# Patient Record
Sex: FEMALE | Race: ASIAN
[De-identification: names, ages, dates, MRNs, and addresses within clinical notes are randomized per-mention and may not be internally consistent; named-entity substitution may affect disease eponyms.]

---

## 2021-10-12 ENCOUNTER — APPOINTMENT (OUTPATIENT)
Dept: ORTHOPEDIC SURGERY | Facility: CLINIC | Age: 56
End: 2021-10-12
Payer: COMMERCIAL

## 2021-10-12 VITALS
TEMPERATURE: 97.1 F | WEIGHT: 125 LBS | HEART RATE: 71 BPM | SYSTOLIC BLOOD PRESSURE: 150 MMHG | BODY MASS INDEX: 23 KG/M2 | HEIGHT: 62 IN | DIASTOLIC BLOOD PRESSURE: 98 MMHG

## 2021-10-12 DIAGNOSIS — M17.12 UNILATERAL PRIMARY OSTEOARTHRITIS, LEFT KNEE: ICD-10-CM

## 2021-10-12 DIAGNOSIS — Z78.9 OTHER SPECIFIED HEALTH STATUS: ICD-10-CM

## 2021-10-12 DIAGNOSIS — Z82.49 FAMILY HISTORY OF ISCHEMIC HEART DISEASE AND OTHER DISEASES OF THE CIRCULATORY SYSTEM: ICD-10-CM

## 2021-10-12 DIAGNOSIS — M25.562 PAIN IN LEFT KNEE: ICD-10-CM

## 2021-10-12 DIAGNOSIS — Z80.0 FAMILY HISTORY OF MALIGNANT NEOPLASM OF DIGESTIVE ORGANS: ICD-10-CM

## 2021-10-12 DIAGNOSIS — S80.02XA CONTUSION OF LEFT KNEE, INITIAL ENCOUNTER: ICD-10-CM

## 2021-10-12 PROBLEM — Z00.00 ENCOUNTER FOR PREVENTIVE HEALTH EXAMINATION: Status: ACTIVE | Noted: 2021-10-12

## 2021-10-12 PROCEDURE — 99204 OFFICE O/P NEW MOD 45 MIN: CPT

## 2021-10-12 PROCEDURE — 73562 X-RAY EXAM OF KNEE 3: CPT | Mod: LT

## 2021-10-12 RX ORDER — MELOXICAM 15 MG/1
15 TABLET ORAL
Qty: 30 | Refills: 2 | Status: ACTIVE | COMMUNITY
Start: 2021-10-12 | End: 1900-01-01

## 2021-10-13 PROBLEM — M17.12 PRIMARY OSTEOARTHRITIS OF LEFT KNEE: Status: ACTIVE | Noted: 2021-10-12

## 2021-10-13 PROBLEM — M25.562 LEFT KNEE PAIN: Status: ACTIVE | Noted: 2021-10-12

## 2021-10-13 NOTE — PHYSICAL EXAM
[UE/LE] : Sensory: Intact in bilateral upper & lower extremities [ALL] : dorsalis pedis, posterior tibial, femoral, popliteal, and radial 2+ and symmetric bilaterally [Normal Finger/nose] : finger to nose coordination [Normal] : no peripheral adenopathy appreciated [Poor Appearance] : well-appearing [Acute Distress] : not in acute distress [de-identified] : Knee Exam\par \par \par FROM to hip\par \par Skin Warm, Dry, no erythema, no lesions \par \par Knee \par stable\par anatomic alignment\par ROM 0-130\par Valgus/Varus Stress intact \par Effusion - Negative\par Crepitus - Negative \par Patella Grind - Negative \par Patella Apprehension - Negative \par Medial joint line tenderness - Negative\par Lateral Joint line tenderness - Negative\par Justa Test - Negative  \par Lachman test - Negative \par Anterior Drawer Test -  Negative \par \par Distal Motor Function intact \par Sensation intact to light touch bilaterally \par Pulses 2+ bilaterally\par \par  [de-identified] : EVGENYR [de-identified] : 3 xray views of the knee were obtained.  No fractures or dislocations are noted.  There is significant patellofemoral arthritis.  Arthritic changes are noted in the medial and lateral compartments.  A patella tracking abnormality is likely secondary to lateral alignment of the patella in the femoral groove.

## 2021-10-13 NOTE — HISTORY OF PRESENT ILLNESS
[de-identified] : 10/12/2021: Patient is a 56 year-old female who presents to the office today for initial evaluation of knee pain. The patient had a fall while hiking this past Friday. She stated that she fell directly on her patella. She fell from a standing position into a position with her knee in full flexion. The pain is on both sides of the knee and across the superior aspect of the patella. She has been taking Ibuprofen which did give relief. She originally had swelling, but that has resolved since this injury. Patient has some pain with ambulation. She does note feelings of instability and locking. She presents today for further treatment options.

## 2021-10-13 NOTE — ASSESSMENT
[FreeTextEntry1] : Patient with left knee pain consistent with a contusion of the patella as well as a likely exacerbation of underlying primary osteoarthritis.  The nature of these conditions were described at length with the patient and she verbalized understanding.  I recommend a patella centralizing compressive knee brace for support.  A prescription for meloxicam was sent for the patient.  She was given a prescription for physical therapy as well.  The patient will follow back up as needed, and if pain persists, a cortisone injection could possibly be offered to the patient.  Patient is in agreement with this plan and appreciative of her care.

## 2023-01-28 ENCOUNTER — OFFICE (OUTPATIENT)
Dept: URBAN - METROPOLITAN AREA CLINIC 94 | Facility: CLINIC | Age: 58
Setting detail: OPHTHALMOLOGY
End: 2023-01-28
Payer: COMMERCIAL

## 2023-01-28 DIAGNOSIS — H43.813: ICD-10-CM

## 2023-01-28 DIAGNOSIS — H43.393: ICD-10-CM

## 2023-01-28 PROBLEM — H25.13 CATARACT SENILE NUCLEAR SCLEROSIS; BOTH EYES: Status: ACTIVE | Noted: 2023-01-28

## 2023-01-28 PROCEDURE — 92012 INTRM OPH EXAM EST PATIENT: CPT | Performed by: OPHTHALMOLOGY

## 2023-01-28 PROCEDURE — 92250 FUNDUS PHOTOGRAPHY W/I&R: CPT | Performed by: OPHTHALMOLOGY

## 2023-01-28 ASSESSMENT — CONFRONTATIONAL VISUAL FIELD TEST (CVF)
OS_FINDINGS: FULL
OD_FINDINGS: FULL

## 2023-01-28 ASSESSMENT — TONOMETRY
OD_IOP_MMHG: 17
OS_IOP_MMHG: 17

## 2023-01-28 ASSESSMENT — VISUAL ACUITY
OS_BCVA: 20/50-
OD_BCVA: 20/20-

## 2023-02-01 ENCOUNTER — Encounter (OUTPATIENT)
Dept: URBAN - METROPOLITAN AREA CLINIC 94 | Facility: CLINIC | Age: 58
Setting detail: OPHTHALMOLOGY
End: 2023-02-01
Payer: COMMERCIAL

## 2024-12-20 ENCOUNTER — OFFICE (OUTPATIENT)
Dept: URBAN - METROPOLITAN AREA CLINIC 6 | Facility: CLINIC | Age: 59
Setting detail: OPHTHALMOLOGY
End: 2024-12-20
Payer: COMMERCIAL

## 2024-12-20 DIAGNOSIS — H43.393: ICD-10-CM

## 2024-12-20 DIAGNOSIS — H43.813: ICD-10-CM

## 2024-12-20 DIAGNOSIS — H25.13: ICD-10-CM

## 2024-12-20 DIAGNOSIS — H25.89: ICD-10-CM

## 2024-12-20 DIAGNOSIS — H35.40: ICD-10-CM

## 2024-12-20 PROCEDURE — 99214 OFFICE O/P EST MOD 30 MIN: CPT | Performed by: OPHTHALMOLOGY

## 2024-12-20 ASSESSMENT — KERATOMETRY
OD_K2POWER_DIOPTERS: 45.00
OS_K2POWER_DIOPTERS: 45.50
OD_K1POWER_DIOPTERS: 44.00
OS_AXISANGLE_DEGREES: 098
OD_AXISANGLE_DEGREES: 090
OS_K1POWER_DIOPTERS: 44.00

## 2024-12-20 ASSESSMENT — REFRACTION_MANIFEST
OS_CYLINDER: -1.00
OD_CYLINDER: -1.00
OS_AXIS: 010
OD_SPHERE: -11.75
OD_AXIS: 065
OS_SPHERE: -8.50
OD_VA1: 20/125
OS_VA1: 20/25+2
OU_VA: 20/25+3

## 2024-12-20 ASSESSMENT — TONOMETRY
OD_IOP_MMHG: 17
OS_IOP_MMHG: 14

## 2024-12-20 ASSESSMENT — REFRACTION_CURRENTRX
OS_CYLINDER: -1.00
OD_ADD: +2.75
OD_AXIS: 003
OD_SPHERE: -7.50
OS_OVR_VA: 20/
OS_ADD: +2.75
OD_CYLINDER: -0.75
OD_OVR_VA: 20/
OS_SPHERE: -8.25
OD_VPRISM_DIRECTION: PROGS
OS_VPRISM_DIRECTION: PROGS
OS_AXIS: 005

## 2024-12-20 ASSESSMENT — REFRACTION_AUTOREFRACTION
OS_SPHERE: -8.75
OD_AXIS: 063
OS_AXIS: 009
OS_CYLINDER: -0.75
OD_CYLINDER: -1.00
OD_SPHERE: -20.25

## 2024-12-20 ASSESSMENT — CONFRONTATIONAL VISUAL FIELD TEST (CVF)
OD_FINDINGS: FULL
OS_FINDINGS: FULL

## 2024-12-20 ASSESSMENT — VISUAL ACUITY
OD_BCVA: 20/30-1
OS_BCVA: 20/500

## 2025-02-22 ENCOUNTER — OFFICE (OUTPATIENT)
Dept: URBAN - METROPOLITAN AREA CLINIC 6 | Facility: CLINIC | Age: 60
Setting detail: OPHTHALMOLOGY
End: 2025-02-22
Payer: COMMERCIAL

## 2025-02-22 DIAGNOSIS — H25.13: ICD-10-CM

## 2025-02-22 DIAGNOSIS — H25.11: ICD-10-CM

## 2025-02-22 DIAGNOSIS — H25.89: ICD-10-CM

## 2025-02-22 PROCEDURE — 92136 OPHTHALMIC BIOMETRY: CPT | Mod: TC | Performed by: OPHTHALMOLOGY

## 2025-02-22 PROCEDURE — 99213 OFFICE O/P EST LOW 20 MIN: CPT | Performed by: OPHTHALMOLOGY

## 2025-02-22 PROCEDURE — 92136 OPHTHALMIC BIOMETRY: CPT | Mod: 26,RT | Performed by: OPHTHALMOLOGY

## 2025-02-22 ASSESSMENT — REFRACTION_AUTOREFRACTION
OS_CYLINDER: -0.75
OS_SPHERE: -8.75
OD_SPHERE: ERR
OS_AXIS: 180

## 2025-02-22 ASSESSMENT — KERATOMETRY
OD_K2POWER_DIOPTERS: 44.75
OD_K1POWER_DIOPTERS: 43.75
OS_AXISANGLE_DEGREES: 95
OS_K2POWER_DIOPTERS: 45.50
OS_K1POWER_DIOPTERS: 44.00
OD_AXISANGLE_DEGREES: 90

## 2025-02-22 ASSESSMENT — REFRACTION_CURRENTRX
OD_CYLINDER: -0.75
OD_SPHERE: -7.50
OS_CYLINDER: -1.00
OS_AXIS: 005
OD_ADD: +2.75
OS_ADD: +2.75
OS_SPHERE: -8.25
OD_AXIS: 003
OD_VPRISM_DIRECTION: PROGS
OD_OVR_VA: 20/
OS_OVR_VA: 20/
OS_VPRISM_DIRECTION: PROGS

## 2025-02-22 ASSESSMENT — REFRACTION_MANIFEST
OU_VA: 20/25+3
OD_VA1: 20/125
OD_AXIS: 065
OS_CYLINDER: -1.00
OS_SPHERE: -8.50
OS_AXIS: 010
OD_SPHERE: -11.75
OS_VA1: 20/25+2
OD_CYLINDER: -1.00

## 2025-02-22 ASSESSMENT — CONFRONTATIONAL VISUAL FIELD TEST (CVF)
OD_FINDINGS: FULL
OS_FINDINGS: FULL

## 2025-02-22 ASSESSMENT — TONOMETRY
OD_IOP_MMHG: 18
OS_IOP_MMHG: 16

## 2025-02-22 ASSESSMENT — VISUAL ACUITY
OD_BCVA: 20/30-1
OS_BCVA: 20/300

## 2025-03-06 ENCOUNTER — ASC (OUTPATIENT)
Dept: URBAN - METROPOLITAN AREA SURGERY 8 | Facility: SURGERY | Age: 60
Setting detail: OPHTHALMOLOGY
End: 2025-03-06
Payer: COMMERCIAL

## 2025-03-06 DIAGNOSIS — H52.221: ICD-10-CM

## 2025-03-06 DIAGNOSIS — H25.11: ICD-10-CM

## 2025-03-06 PROCEDURE — 66984 XCAPSL CTRC RMVL W/O ECP: CPT | Mod: RT | Performed by: OPHTHALMOLOGY

## 2025-03-06 PROCEDURE — FEMTO PRECISION LASER CATARACT SURGERY: Mod: GY,RT | Performed by: OPHTHALMOLOGY

## 2025-03-07 ENCOUNTER — RX ONLY (RX ONLY)
Age: 60
End: 2025-03-07

## 2025-03-07 ENCOUNTER — OFFICE (OUTPATIENT)
Dept: URBAN - METROPOLITAN AREA CLINIC 6 | Facility: CLINIC | Age: 60
Setting detail: OPHTHALMOLOGY
End: 2025-03-07
Payer: COMMERCIAL

## 2025-03-07 DIAGNOSIS — H25.12: ICD-10-CM

## 2025-03-07 PROCEDURE — 92136 OPHTHALMIC BIOMETRY: CPT | Mod: 26,LT | Performed by: OPHTHALMOLOGY

## 2025-03-07 ASSESSMENT — REFRACTION_MANIFEST
OS_SPHERE: -8.50
OS_AXIS: 010
OD_AXIS: 065
OD_CYLINDER: -1.00
OS_VA1: 20/25+2
OD_SPHERE: -11.75
OS_CYLINDER: -1.00
OU_VA: 20/25+3
OD_VA1: 20/125

## 2025-03-07 ASSESSMENT — VISUAL ACUITY
OD_BCVA: 20/500
OS_BCVA: 20/25-2

## 2025-03-07 ASSESSMENT — REFRACTION_CURRENTRX
OS_OVR_VA: 20/
OD_VPRISM_DIRECTION: PROGS
OD_SPHERE: -7.50
OD_ADD: +2.75
OD_OVR_VA: 20/
OS_SPHERE: -8.25
OS_VPRISM_DIRECTION: PROGS
OD_CYLINDER: -0.75
OS_AXIS: 005
OD_AXIS: 003
OS_CYLINDER: -1.00
OS_ADD: +2.75

## 2025-03-07 ASSESSMENT — REFRACTION_AUTOREFRACTION
OS_AXIS: 005
OS_SPHERE: -9.25
OD_AXIS: 019
OD_CYLINDER: -1.25
OS_CYLINDER: -1.00
OD_SPHERE: +0.50

## 2025-03-07 ASSESSMENT — KERATOMETRY
OD_AXISANGLE_DEGREES: 112
OS_K2POWER_DIOPTERS: 45.75
OD_K2POWER_DIOPTERS: 44.75
OD_K1POWER_DIOPTERS: 43.50
OS_AXISANGLE_DEGREES: 090
OS_K1POWER_DIOPTERS: 44.00
METHOD_AUTO_MANUAL: AUTO

## 2025-03-07 ASSESSMENT — CONFRONTATIONAL VISUAL FIELD TEST (CVF)
OS_FINDINGS: FULL
OD_FINDINGS: FULL

## 2025-03-07 ASSESSMENT — TONOMETRY
OD_IOP_MMHG: 14
OS_IOP_MMHG: 13

## 2025-03-13 ENCOUNTER — ASC (OUTPATIENT)
Dept: URBAN - METROPOLITAN AREA SURGERY 8 | Facility: SURGERY | Age: 60
Setting detail: OPHTHALMOLOGY
End: 2025-03-13
Payer: COMMERCIAL

## 2025-03-13 DIAGNOSIS — H25.12: ICD-10-CM

## 2025-03-13 DIAGNOSIS — H52.222: ICD-10-CM

## 2025-03-13 PROCEDURE — 66984 XCAPSL CTRC RMVL W/O ECP: CPT | Mod: 79,LT | Performed by: OPHTHALMOLOGY

## 2025-03-13 PROCEDURE — FEMTO PRECISION LASER CATARACT SURGERY: Mod: GY | Performed by: OPHTHALMOLOGY

## 2025-03-14 ENCOUNTER — RX ONLY (RX ONLY)
Age: 60
End: 2025-03-14

## 2025-03-14 ENCOUNTER — OFFICE (OUTPATIENT)
Dept: URBAN - METROPOLITAN AREA CLINIC 6 | Facility: CLINIC | Age: 60
Setting detail: OPHTHALMOLOGY
End: 2025-03-14
Payer: COMMERCIAL

## 2025-03-14 DIAGNOSIS — H16.222: ICD-10-CM

## 2025-03-14 DIAGNOSIS — Z96.1: ICD-10-CM

## 2025-03-14 PROCEDURE — 99024 POSTOP FOLLOW-UP VISIT: CPT

## 2025-03-14 ASSESSMENT — TONOMETRY
OS_IOP_MMHG: 20
OD_IOP_MMHG: 19

## 2025-03-14 ASSESSMENT — REFRACTION_CURRENTRX
OD_SPHERE: -7.50
OS_CYLINDER: -1.00
OD_AXIS: 003
OS_SPHERE: -8.25
OS_AXIS: 005
OS_OVR_VA: 20/
OD_OVR_VA: 20/
OD_VPRISM_DIRECTION: PROGS
OD_ADD: +2.75
OS_ADD: +2.75
OD_CYLINDER: -0.75
OS_VPRISM_DIRECTION: PROGS

## 2025-03-14 ASSESSMENT — REFRACTION_AUTOREFRACTION
OD_AXIS: 022
OD_CYLINDER: -0.25
OS_AXIS: 035
OS_SPHERE: +0.25
OD_SPHERE: 0.00
OS_CYLINDER: -0.75

## 2025-03-14 ASSESSMENT — REFRACTION_MANIFEST
OS_VA1: 20/25+2
OS_AXIS: 010
OD_CYLINDER: -1.00
OD_SPHERE: -11.75
OU_VA: 20/25+3
OD_VA1: 20/125
OS_SPHERE: -8.50
OS_CYLINDER: -1.00
OD_AXIS: 065

## 2025-03-14 ASSESSMENT — VISUAL ACUITY
OD_BCVA: 20/20-2
OS_BCVA: 20/20-1

## 2025-03-14 ASSESSMENT — SUPERFICIAL PUNCTATE KERATITIS (SPK): OS_SPK: T

## 2025-03-14 ASSESSMENT — KERATOMETRY
OS_AXISANGLE_DEGREES: 105
OD_K1POWER_DIOPTERS: 44.00
OS_K1POWER_DIOPTERS: 44.25
METHOD_AUTO_MANUAL: AUTO
OD_K2POWER_DIOPTERS: 44.50
OD_AXISANGLE_DEGREES: 103
OS_K2POWER_DIOPTERS: 45.25

## 2025-03-14 ASSESSMENT — CONFRONTATIONAL VISUAL FIELD TEST (CVF)
OS_FINDINGS: FULL
OD_FINDINGS: FULL

## 2025-03-20 ENCOUNTER — OFFICE (OUTPATIENT)
Dept: URBAN - METROPOLITAN AREA CLINIC 6 | Facility: CLINIC | Age: 60
Setting detail: OPHTHALMOLOGY
End: 2025-03-20
Payer: COMMERCIAL

## 2025-03-20 DIAGNOSIS — Z96.1: ICD-10-CM

## 2025-03-20 PROCEDURE — 99024 POSTOP FOLLOW-UP VISIT: CPT

## 2025-03-20 ASSESSMENT — REFRACTION_MANIFEST
OS_CYLINDER: -1.00
OD_CYLINDER: -1.00
OS_AXIS: 010
OD_SPHERE: -11.75
OS_SPHERE: -8.50
OU_VA: 20/25+3
OD_AXIS: 065
OD_VA1: 20/125
OS_VA1: 20/25+2

## 2025-03-20 ASSESSMENT — REFRACTION_CURRENTRX
OD_CYLINDER: -0.75
OS_ADD: +2.75
OS_CYLINDER: -1.00
OD_OVR_VA: 20/
OD_SPHERE: -7.50
OD_VPRISM_DIRECTION: PROGS
OS_SPHERE: -8.25
OD_AXIS: 003
OD_ADD: +2.75
OS_AXIS: 005
OS_OVR_VA: 20/
OS_VPRISM_DIRECTION: PROGS

## 2025-03-20 ASSESSMENT — CONFRONTATIONAL VISUAL FIELD TEST (CVF)
OS_FINDINGS: FULL
OD_FINDINGS: FULL

## 2025-03-20 ASSESSMENT — KERATOMETRY
OD_K2POWER_DIOPTERS: 44.75
OD_AXISANGLE_DEGREES: 097
OS_AXISANGLE_DEGREES: 095
OS_K1POWER_DIOPTERS: 44.25
METHOD_AUTO_MANUAL: AUTO
OD_K1POWER_DIOPTERS: 44.00
OS_K2POWER_DIOPTERS: 45.50

## 2025-03-20 ASSESSMENT — VISUAL ACUITY
OD_BCVA: 20/20-1
OS_BCVA: 20/20-1

## 2025-03-20 ASSESSMENT — REFRACTION_AUTOREFRACTION
OD_AXIS: 017
OS_SPHERE: +0.50
OS_AXIS: 022
OS_CYLINDER: -1.00
OD_CYLINDER: -0.50
OD_SPHERE: -0.25

## 2025-03-20 ASSESSMENT — TONOMETRY
OD_IOP_MMHG: 20
OS_IOP_MMHG: 18

## 2025-03-20 ASSESSMENT — SUPERFICIAL PUNCTATE KERATITIS (SPK): OS_SPK: T

## 2025-04-24 ENCOUNTER — OFFICE (OUTPATIENT)
Dept: URBAN - METROPOLITAN AREA CLINIC 6 | Facility: CLINIC | Age: 60
Setting detail: OPHTHALMOLOGY
End: 2025-04-24
Payer: COMMERCIAL

## 2025-04-24 DIAGNOSIS — Z96.1: ICD-10-CM

## 2025-04-24 PROBLEM — H16.221 DRY EYE SYNDROME K SICCA; RIGHT EYE: Status: ACTIVE | Noted: 2025-04-24

## 2025-04-24 PROCEDURE — 99024 POSTOP FOLLOW-UP VISIT: CPT

## 2025-04-24 ASSESSMENT — REFRACTION_AUTOREFRACTION
OS_CYLINDER: -0.75
OS_SPHERE: +0.25
OD_CYLINDER: -0.50
OD_AXIS: 001
OS_AXIS: 033
OD_SPHERE: -0.25

## 2025-04-24 ASSESSMENT — REFRACTION_MANIFEST
OD_VA1: 20/20
OS_CYLINDER: -0.50
OS_ADD: +2.25
OD_CYLINDER: -0.50
OD_SPHERE: -0.25
OS_VA1: 20/20
OS_AXIS: 30
OD_ADD: +2.25
OD_AXIS: 180
OS_SPHERE: +0.25

## 2025-04-24 ASSESSMENT — KERATOMETRY
OD_K1POWER_DIOPTERS: 44.00
OS_K2POWER_DIOPTERS: 45.25
OD_K2POWER_DIOPTERS: 45.00
METHOD_AUTO_MANUAL: AUTO
OD_AXISANGLE_DEGREES: 112
OS_K1POWER_DIOPTERS: 44.50
OS_AXISANGLE_DEGREES: 100

## 2025-04-24 ASSESSMENT — REFRACTION_CURRENTRX
OD_VPRISM_DIRECTION: PROGS
OS_ADD: +2.75
OS_OVR_VA: 20/
OS_AXIS: 005
OS_CYLINDER: -1.00
OD_OVR_VA: 20/
OD_AXIS: 003
OD_ADD: +2.75
OD_CYLINDER: -0.75
OS_SPHERE: -8.25
OD_SPHERE: -7.50
OS_VPRISM_DIRECTION: PROGS

## 2025-04-24 ASSESSMENT — TONOMETRY
OS_IOP_MMHG: 15
OD_IOP_MMHG: 13

## 2025-04-24 ASSESSMENT — VISUAL ACUITY
OD_BCVA: 20/20-2
OS_BCVA: 20/20-

## 2025-04-24 ASSESSMENT — CONFRONTATIONAL VISUAL FIELD TEST (CVF)
OS_FINDINGS: FULL
OD_FINDINGS: FULL

## 2025-04-24 ASSESSMENT — SUPERFICIAL PUNCTATE KERATITIS (SPK): OD_SPK: 1+

## 2025-07-24 ENCOUNTER — OFFICE (OUTPATIENT)
Dept: URBAN - METROPOLITAN AREA CLINIC 6 | Facility: CLINIC | Age: 60
Setting detail: OPHTHALMOLOGY
End: 2025-07-24
Payer: COMMERCIAL

## 2025-07-24 DIAGNOSIS — H43.393: ICD-10-CM

## 2025-07-24 DIAGNOSIS — H35.40: ICD-10-CM

## 2025-07-24 DIAGNOSIS — H43.813: ICD-10-CM

## 2025-07-24 DIAGNOSIS — H35.412: ICD-10-CM

## 2025-07-24 PROBLEM — H16.223 DRY EYE SYNDROME K SICCA; BOTH EYES: Status: ACTIVE | Noted: 2025-07-24

## 2025-07-24 PROCEDURE — 92014 COMPRE OPH EXAM EST PT 1/>: CPT

## 2025-07-24 ASSESSMENT — REFRACTION_AUTOREFRACTION
OS_SPHERE: +0.25
OS_CYLINDER: -0.75
OS_AXIS: 044
OD_CYLINDER: -0.25
OD_SPHERE: -0.25
OD_AXIS: 004

## 2025-07-24 ASSESSMENT — REFRACTION_CURRENTRX
OD_CYLINDER: -0.75
OD_AXIS: 003
OD_VPRISM_DIRECTION: PROGS
OS_OVR_VA: 20/
OS_ADD: +2.75
OD_SPHERE: -7.50
OS_SPHERE: -8.25
OS_AXIS: 005
OD_OVR_VA: 20/
OS_VPRISM_DIRECTION: PROGS
OD_ADD: +2.75
OS_CYLINDER: -1.00

## 2025-07-24 ASSESSMENT — REFRACTION_MANIFEST
OS_SPHERE: +0.25
OS_CYLINDER: -0.50
OD_SPHERE: -0.25
OD_CYLINDER: -0.50
OS_ADD: +2.25
OS_VA1: 20/20
OS_AXIS: 30
OD_ADD: +2.25
OD_AXIS: 180
OD_VA1: 20/20

## 2025-07-24 ASSESSMENT — KERATOMETRY
OS_K2POWER_DIOPTERS: 45.25
OD_K1POWER_DIOPTERS: 44.00
METHOD_AUTO_MANUAL: AUTO
OS_AXISANGLE_DEGREES: 116
OD_AXISANGLE_DEGREES: 092
OD_K2POWER_DIOPTERS: 44.50
OS_K1POWER_DIOPTERS: 44.25

## 2025-07-24 ASSESSMENT — CONFRONTATIONAL VISUAL FIELD TEST (CVF)
OS_FINDINGS: FULL
OD_FINDINGS: FULL

## 2025-07-24 ASSESSMENT — SUPERFICIAL PUNCTATE KERATITIS (SPK)
OD_SPK: T
OS_SPK: T

## 2025-07-24 ASSESSMENT — TONOMETRY
OD_IOP_MMHG: 18
OS_IOP_MMHG: 15

## 2025-07-24 ASSESSMENT — VISUAL ACUITY
OS_BCVA: 20/20-2
OD_BCVA: 20/20-2